# Patient Record
Sex: FEMALE | Race: ASIAN | Employment: UNEMPLOYED | URBAN - METROPOLITAN AREA
[De-identification: names, ages, dates, MRNs, and addresses within clinical notes are randomized per-mention and may not be internally consistent; named-entity substitution may affect disease eponyms.]

---

## 2021-03-19 ENCOUNTER — TELEPHONE (OUTPATIENT)
Dept: INTERNAL MEDICINE CLINIC | Age: 56
End: 2021-03-19

## 2021-03-19 ENCOUNTER — OFFICE VISIT (OUTPATIENT)
Dept: INTERNAL MEDICINE CLINIC | Age: 56
End: 2021-03-19
Payer: COMMERCIAL

## 2021-03-19 VITALS
TEMPERATURE: 97.3 F | BODY MASS INDEX: 25.58 KG/M2 | WEIGHT: 139 LBS | HEIGHT: 62 IN | DIASTOLIC BLOOD PRESSURE: 68 MMHG | SYSTOLIC BLOOD PRESSURE: 98 MMHG

## 2021-03-19 DIAGNOSIS — G47.00 INSOMNIA, UNSPECIFIED TYPE: ICD-10-CM

## 2021-03-19 DIAGNOSIS — Z12.31 ENCOUNTER FOR SCREENING MAMMOGRAM FOR BREAST CANCER: ICD-10-CM

## 2021-03-19 DIAGNOSIS — Z12.11 COLON CANCER SCREENING: Primary | ICD-10-CM

## 2021-03-19 DIAGNOSIS — Z13.220 SCREENING FOR HYPERLIPIDEMIA: ICD-10-CM

## 2021-03-19 DIAGNOSIS — F32.A DEPRESSION, UNSPECIFIED DEPRESSION TYPE: ICD-10-CM

## 2021-03-19 PROCEDURE — 1036F TOBACCO NON-USER: CPT | Performed by: INTERNAL MEDICINE

## 2021-03-19 PROCEDURE — 3017F COLORECTAL CA SCREEN DOC REV: CPT | Performed by: INTERNAL MEDICINE

## 2021-03-19 PROCEDURE — 99204 OFFICE O/P NEW MOD 45 MIN: CPT | Performed by: INTERNAL MEDICINE

## 2021-03-19 PROCEDURE — G8484 FLU IMMUNIZE NO ADMIN: HCPCS | Performed by: INTERNAL MEDICINE

## 2021-03-19 PROCEDURE — G8419 CALC BMI OUT NRM PARAM NOF/U: HCPCS | Performed by: INTERNAL MEDICINE

## 2021-03-19 PROCEDURE — G8427 DOCREV CUR MEDS BY ELIG CLIN: HCPCS | Performed by: INTERNAL MEDICINE

## 2021-03-19 RX ORDER — LANOLIN ALCOHOL/MO/W.PET/CERES
3 CREAM (GRAM) TOPICAL DAILY
Qty: 30 TABLET | Refills: 0 | Status: SHIPPED | OUTPATIENT
Start: 2021-03-19 | End: 2021-09-30

## 2021-03-19 RX ORDER — FLUOXETINE HYDROCHLORIDE 20 MG/1
20 CAPSULE ORAL DAILY
Qty: 30 CAPSULE | Refills: 3 | Status: SHIPPED | OUTPATIENT
Start: 2021-03-19 | End: 2022-09-13 | Stop reason: SDUPTHER

## 2021-03-19 ASSESSMENT — PATIENT HEALTH QUESTIONNAIRE - PHQ9
SUM OF ALL RESPONSES TO PHQ QUESTIONS 1-9: 2
SUM OF ALL RESPONSES TO PHQ9 QUESTIONS 1 & 2: 2
1. LITTLE INTEREST OR PLEASURE IN DOING THINGS: 1
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
## 2021-03-19 NOTE — TELEPHONE ENCOUNTER
At ck out pt's son, Kiet Koroma, on [facility], stated Dr. Maria C Lomeli mentioned pt should take otc medication but forgot what medication it was. Please advise.

## 2021-03-20 ASSESSMENT — ENCOUNTER SYMPTOMS
EYE DISCHARGE: 0
BLOOD IN STOOL: 0
ABDOMINAL PAIN: 0
CHEST TIGHTNESS: 0
BACK PAIN: 0
COLOR CHANGE: 0
APNEA: 0
SHORTNESS OF BREATH: 0
COUGH: 0
EYE REDNESS: 0
ABDOMINAL DISTENTION: 0
EYE PAIN: 0
EYE ITCHING: 0
CHOKING: 0
CONSTIPATION: 0
DIARRHEA: 0

## 2021-05-05 ENCOUNTER — HOSPITAL ENCOUNTER (OUTPATIENT)
Age: 56
Setting detail: SPECIMEN
Discharge: HOME | End: 2021-05-05
Payer: COMMERCIAL

## 2021-05-05 DIAGNOSIS — G47.00 INSOMNIA, UNSPECIFIED TYPE: ICD-10-CM

## 2021-05-05 DIAGNOSIS — Z13.220 SCREENING FOR HYPERLIPIDEMIA: ICD-10-CM

## 2021-05-05 DIAGNOSIS — F32.A DEPRESSION, UNSPECIFIED DEPRESSION TYPE: ICD-10-CM

## 2021-05-05 LAB
ALBUMIN SERPL-MCNC: 4.1 G/DL (ref 3.5–5.2)
ALBUMIN/GLOBULIN RATIO: 1.3 (ref 1–2.5)
ALP BLD-CCNC: 99 U/L (ref 35–104)
ALT SERPL-CCNC: 14 U/L (ref 5–33)
ANION GAP SERPL CALCULATED.3IONS-SCNC: 12 MMOL/L (ref 9–17)
AST SERPL-CCNC: 16 U/L
BILIRUB SERPL-MCNC: 0.99 MG/DL (ref 0.3–1.2)
BUN BLDV-MCNC: 14 MG/DL (ref 6–20)
BUN/CREAT BLD: ABNORMAL (ref 9–20)
CALCIUM SERPL-MCNC: 9.5 MG/DL (ref 8.6–10.4)
CHLORIDE BLD-SCNC: 105 MMOL/L (ref 98–107)
CHOLESTEROL, FASTING: 192 MG/DL
CHOLESTEROL/HDL RATIO: 3.4
CO2: 24 MMOL/L (ref 20–31)
CREAT SERPL-MCNC: 0.47 MG/DL (ref 0.5–0.9)
GFR AFRICAN AMERICAN: >60 ML/MIN
GFR NON-AFRICAN AMERICAN: >60 ML/MIN
GFR SERPL CREATININE-BSD FRML MDRD: ABNORMAL ML/MIN/{1.73_M2}
GFR SERPL CREATININE-BSD FRML MDRD: ABNORMAL ML/MIN/{1.73_M2}
GLUCOSE BLD-MCNC: 91 MG/DL (ref 70–99)
HCT VFR BLD CALC: 38.4 % (ref 36.3–47.1)
HDLC SERPL-MCNC: 56 MG/DL
HEMOGLOBIN: 12.5 G/DL (ref 11.9–15.1)
LDL CHOLESTEROL: 102 MG/DL (ref 0–130)
MCH RBC QN AUTO: 30.5 PG (ref 25.2–33.5)
MCHC RBC AUTO-ENTMCNC: 32.6 G/DL (ref 28.4–34.8)
MCV RBC AUTO: 93.7 FL (ref 82.6–102.9)
NRBC AUTOMATED: 0 PER 100 WBC
PDW BLD-RTO: 12.3 % (ref 11.8–14.4)
PLATELET # BLD: 277 K/UL (ref 138–453)
PMV BLD AUTO: 11.4 FL (ref 8.1–13.5)
POTASSIUM SERPL-SCNC: 5.2 MMOL/L (ref 3.7–5.3)
RBC # BLD: 4.1 M/UL (ref 3.95–5.11)
SODIUM BLD-SCNC: 141 MMOL/L (ref 135–144)
TOTAL PROTEIN: 7.3 G/DL (ref 6.4–8.3)
TRIGLYCERIDE, FASTING: 170 MG/DL
TSH SERPL DL<=0.05 MIU/L-ACNC: 1.62 MIU/L (ref 0.3–5)
VLDLC SERPL CALC-MCNC: ABNORMAL MG/DL (ref 1–30)
WBC # BLD: 7.3 K/UL (ref 3.5–11.3)

## 2021-05-18 DIAGNOSIS — Z12.11 COLON CANCER SCREENING: ICD-10-CM

## 2021-08-13 ENCOUNTER — TELEPHONE (OUTPATIENT)
Dept: FAMILY MEDICINE CLINIC | Age: 56
End: 2021-08-13

## 2021-08-13 NOTE — TELEPHONE ENCOUNTER
Paula Coates was contacted as part of mammography outreach. Unable to contact patient.      Beryl Cody

## 2021-09-30 ENCOUNTER — OFFICE VISIT (OUTPATIENT)
Dept: INTERNAL MEDICINE CLINIC | Age: 56
End: 2021-09-30
Payer: COMMERCIAL

## 2021-09-30 VITALS
SYSTOLIC BLOOD PRESSURE: 96 MMHG | WEIGHT: 137.6 LBS | BODY MASS INDEX: 25.32 KG/M2 | HEIGHT: 62 IN | DIASTOLIC BLOOD PRESSURE: 64 MMHG

## 2021-09-30 DIAGNOSIS — G47.00 INSOMNIA, UNSPECIFIED TYPE: ICD-10-CM

## 2021-09-30 DIAGNOSIS — F32.A DEPRESSION, UNSPECIFIED DEPRESSION TYPE: ICD-10-CM

## 2021-09-30 DIAGNOSIS — Z23 NEED FOR VACCINATION: ICD-10-CM

## 2021-09-30 DIAGNOSIS — M77.12 LATERAL EPICONDYLITIS OF LEFT ELBOW: Primary | ICD-10-CM

## 2021-09-30 PROCEDURE — G8419 CALC BMI OUT NRM PARAM NOF/U: HCPCS | Performed by: INTERNAL MEDICINE

## 2021-09-30 PROCEDURE — 90471 IMMUNIZATION ADMIN: CPT | Performed by: INTERNAL MEDICINE

## 2021-09-30 PROCEDURE — 1036F TOBACCO NON-USER: CPT | Performed by: INTERNAL MEDICINE

## 2021-09-30 PROCEDURE — 3017F COLORECTAL CA SCREEN DOC REV: CPT | Performed by: INTERNAL MEDICINE

## 2021-09-30 PROCEDURE — 99214 OFFICE O/P EST MOD 30 MIN: CPT | Performed by: INTERNAL MEDICINE

## 2021-09-30 PROCEDURE — G8427 DOCREV CUR MEDS BY ELIG CLIN: HCPCS | Performed by: INTERNAL MEDICINE

## 2021-09-30 PROCEDURE — 90674 CCIIV4 VAC NO PRSV 0.5 ML IM: CPT | Performed by: INTERNAL MEDICINE

## 2021-09-30 SDOH — ECONOMIC STABILITY: FOOD INSECURITY: WITHIN THE PAST 12 MONTHS, YOU WORRIED THAT YOUR FOOD WOULD RUN OUT BEFORE YOU GOT MONEY TO BUY MORE.: NEVER TRUE

## 2021-09-30 SDOH — ECONOMIC STABILITY: FOOD INSECURITY: WITHIN THE PAST 12 MONTHS, THE FOOD YOU BOUGHT JUST DIDN'T LAST AND YOU DIDN'T HAVE MONEY TO GET MORE.: NEVER TRUE

## 2021-09-30 ASSESSMENT — SOCIAL DETERMINANTS OF HEALTH (SDOH): HOW HARD IS IT FOR YOU TO PAY FOR THE VERY BASICS LIKE FOOD, HOUSING, MEDICAL CARE, AND HEATING?: NOT HARD AT ALL

## 2021-09-30 NOTE — PROGRESS NOTES
Subjective:      Patient ID: Jarred Martínez is a 55 y.o. female. HPI-patient is here for evaluation of multiple medical problems. She has depression, insomnia. She is on Prozac and melatonin. Her symptoms are very much controlled  She noticed pain in outer aspect of left elbow symptoms are going on for last few days. No injury, trauma, fall. Pain get worse with movement of her. No swelling noticed at the local site . She works at home, some time ends up lifting heavy stuff. No other complaints    Review of Systems   Constitutional: Negative for activity change, appetite change, chills, diaphoresis, fatigue and fever. HENT: Negative for congestion, dental problem, drooling and ear discharge. Eyes: Negative for pain, discharge, redness and itching. Respiratory: Negative for apnea, cough, choking, chest tightness and shortness of breath. Cardiovascular: Negative for chest pain and leg swelling. Gastrointestinal: Negative for abdominal distention, abdominal pain, blood in stool, constipation and diarrhea. Endocrine: Negative for cold intolerance and heat intolerance. Genitourinary: Negative for difficulty urinating, dysuria, enuresis, flank pain and frequency. Musculoskeletal: Positive for arthralgias (Outer aspect of left elbow. ). Negative for back pain, gait problem and joint swelling. Skin: Negative for color change, pallor and rash. Neurological: Negative for dizziness, facial asymmetry, light-headedness, numbness and headaches. Psychiatric/Behavioral: Negative for agitation, behavioral problems, confusion, decreased concentration and dysphoric mood. Objective:   Physical Exam  Constitutional:       Appearance: She is well-developed. She is not diaphoretic. HENT:      Head: Normocephalic and atraumatic. Mouth/Throat:      Pharynx: No oropharyngeal exudate. Eyes:      General: No scleral icterus. Right eye: No discharge. Left eye: No discharge. Conjunctiva/sclera: Conjunctivae normal.      Pupils: Pupils are equal, round, and reactive to light. Neck:      Thyroid: No thyromegaly. Vascular: No JVD. Trachea: No tracheal deviation. Cardiovascular:      Rate and Rhythm: Normal rate. Heart sounds: Normal heart sounds. No murmur heard. No gallop. Pulmonary:      Effort: Pulmonary effort is normal. No respiratory distress. Breath sounds: Normal breath sounds. No stridor. No wheezing or rales. Chest:      Chest wall: No tenderness. Abdominal:      General: Bowel sounds are normal. There is no distension. Palpations: Abdomen is soft. Tenderness: There is no abdominal tenderness. There is no guarding or rebound. Musculoskeletal:         General: Tenderness (Lateral epicondylitis of left elbow) present. Normal range of motion. Cervical back: Normal range of motion and neck supple. Neurological:      Mental Status: She is alert and oriented to person, place, and time. Assessment / Plan:   1. Lateral epicondylitis of left elbow  OTC TYlenol   - [employer] Arm[employer] Tennis Elbow strap REATIL$20    2. Need for vaccination  - INFLUENZA, MDCK QUADV, 2 YRS AND OLDER, IM, PF, PREFILL SYR OR SDV, 0.5ML (FLUCELVAX QUADV, PF)    3. Depression, unspecified depression type  Stable    4. Insomnia, unspecified type  Controlled on melatonin      · No follow-ups on file. · Reviewed prior labs and health maintenance. · Discussed use, benefit, and side effects of prescribed medications. Barriers to medication compliance addressed. All patient questions answered. Pt voiced understanding. Winnie Closs, MD [facility]  10/1/2021, 3:42 PM    Please note that this chart was generated using voice recognition Dragon dictation software. Although every effort was made to ensure the accuracy of this automated transcription, some errors in transcription may have occurred.         Visit Information    Have you changed or started any medications since your last visit including any over-the-counter medicines, vitamins, or herbal medicines? no   Are you having any side effects from any of your medications? -  no  Have you stopped taking any of your medications? Is so, why? -  no    Have you seen any other physician or provider since your last visit? No  Have you had any other diagnostic tests since your last visit? No  Have you been seen in the emergency room and/or had an admission to a hospital since we last saw you? No  Have you had your routine dental cleaning in the past 6 months? no    Have you activated your [employer] account? If not, what are your barriers?  Yes     Patient Care Team:  Lord Angela MD as PCP - General (Internal Medicine)  Lord Angela MD as PCP - [facility]    Medical History Review  Past Medical, Family, and Social History reviewed and does not contribute to the patient presenting condition    Health Maintenance   Topic Date Due    Hepatitis C screen  Never done    COVID-19 Vaccine (1) Never done    HIV screen  Never done    DTaP/Tdap/Td vaccine (1 - Tdap) Never done    Cervical cancer screen  Never done    Breast cancer screen  Never done    Shingles Vaccine (1 of 2) Never done    Flu vaccine (1) Never done    Colon cancer screen fecal DNA test (Cologuard)  05/10/2024    Lipid screen  05/05/2026    Hepatitis A vaccine  Aged Out    Hepatitis B vaccine  Aged Out    Hib vaccine  Aged Out    Meningococcal (ACWY) vaccine  Aged Out    Pneumococcal 0-64 years Vaccine  Aged Out

## 2021-10-01 ASSESSMENT — ENCOUNTER SYMPTOMS
BACK PAIN: 0
COLOR CHANGE: 0
EYE PAIN: 0
CONSTIPATION: 0
ABDOMINAL DISTENTION: 0
ABDOMINAL PAIN: 0
DIARRHEA: 0
CHEST TIGHTNESS: 0
EYE REDNESS: 0
COUGH: 0
SHORTNESS OF BREATH: 0
BLOOD IN STOOL: 0
CHOKING: 0
APNEA: 0
EYE DISCHARGE: 0
EYE ITCHING: 0

## 2022-09-13 ENCOUNTER — OFFICE VISIT (OUTPATIENT)
Dept: INTERNAL MEDICINE CLINIC | Age: 57
End: 2022-09-13
Payer: COMMERCIAL

## 2022-09-13 VITALS
HEIGHT: 62 IN | SYSTOLIC BLOOD PRESSURE: 102 MMHG | DIASTOLIC BLOOD PRESSURE: 64 MMHG | BODY MASS INDEX: 25.69 KG/M2 | OXYGEN SATURATION: 99 % | HEART RATE: 86 BPM | WEIGHT: 139.6 LBS

## 2022-09-13 DIAGNOSIS — F32.A DEPRESSION, UNSPECIFIED DEPRESSION TYPE: ICD-10-CM

## 2022-09-13 DIAGNOSIS — Z12.31 ENCOUNTER FOR SCREENING MAMMOGRAM FOR BREAST CANCER: Primary | ICD-10-CM

## 2022-09-13 DIAGNOSIS — E11.8 CONTROLLED TYPE 2 DIABETES MELLITUS WITH COMPLICATION, WITHOUT LONG-TERM CURRENT USE OF INSULIN (HCC): ICD-10-CM

## 2022-09-13 PROCEDURE — G8427 DOCREV CUR MEDS BY ELIG CLIN: HCPCS | Performed by: INTERNAL MEDICINE

## 2022-09-13 PROCEDURE — 3017F COLORECTAL CA SCREEN DOC REV: CPT | Performed by: INTERNAL MEDICINE

## 2022-09-13 PROCEDURE — 3046F HEMOGLOBIN A1C LEVEL >9.0%: CPT | Performed by: INTERNAL MEDICINE

## 2022-09-13 PROCEDURE — G8419 CALC BMI OUT NRM PARAM NOF/U: HCPCS | Performed by: INTERNAL MEDICINE

## 2022-09-13 PROCEDURE — 1036F TOBACCO NON-USER: CPT | Performed by: INTERNAL MEDICINE

## 2022-09-13 PROCEDURE — 99213 OFFICE O/P EST LOW 20 MIN: CPT | Performed by: INTERNAL MEDICINE

## 2022-09-13 PROCEDURE — 2022F DILAT RTA XM EVC RTNOPTHY: CPT | Performed by: INTERNAL MEDICINE

## 2022-09-13 RX ORDER — FLUOXETINE HYDROCHLORIDE 20 MG/1
20 CAPSULE ORAL DAILY
Qty: 90 CAPSULE | Refills: 3 | Status: SHIPPED | OUTPATIENT
Start: 2022-09-13

## 2022-09-13 ASSESSMENT — PATIENT HEALTH QUESTIONNAIRE - PHQ9
8. MOVING OR SPEAKING SO SLOWLY THAT OTHER PEOPLE COULD HAVE NOTICED. OR THE OPPOSITE, BEING SO FIGETY OR RESTLESS THAT YOU HAVE BEEN MOVING AROUND A LOT MORE THAN USUAL: 0
SUM OF ALL RESPONSES TO PHQ QUESTIONS 1-9: 0
6. FEELING BAD ABOUT YOURSELF - OR THAT YOU ARE A FAILURE OR HAVE LET YOURSELF OR YOUR FAMILY DOWN: 0
7. TROUBLE CONCENTRATING ON THINGS, SUCH AS READING THE NEWSPAPER OR WATCHING TELEVISION: 0
SUM OF ALL RESPONSES TO PHQ9 QUESTIONS 1 & 2: 0
2. FEELING DOWN, DEPRESSED OR HOPELESS: 0
1. LITTLE INTEREST OR PLEASURE IN DOING THINGS: 0
9. THOUGHTS THAT YOU WOULD BE BETTER OFF DEAD, OR OF HURTING YOURSELF: 0
SUM OF ALL RESPONSES TO PHQ QUESTIONS 1-9: 0
SUM OF ALL RESPONSES TO PHQ QUESTIONS 1-9: 0
5. POOR APPETITE OR OVEREATING: 0
10. IF YOU CHECKED OFF ANY PROBLEMS, HOW DIFFICULT HAVE THESE PROBLEMS MADE IT FOR YOU TO DO YOUR WORK, TAKE CARE OF THINGS AT HOME, OR GET ALONG WITH OTHER PEOPLE: 0
3. TROUBLE FALLING OR STAYING ASLEEP: 0
4. FEELING TIRED OR HAVING LITTLE ENERGY: 0
SUM OF ALL RESPONSES TO PHQ QUESTIONS 1-9: 0

## 2022-09-13 NOTE — PROGRESS NOTES
Visit Information    Have you changed or started any medications since your last visit including any over-the-counter medicines, vitamins, or herbal medicines? no   Are you having any side effects from any of your medications? -  no  Have you stopped taking any of your medications? Is so, why? -  no    Have you seen any other physician or provider since your last visit? No  Have you had any other diagnostic tests since your last visit? No  Have you been seen in the emergency room and/or had an admission to a hospital since we last saw you? No  Have you had your routine dental cleaning in the past 6 months? no    Have you activated your [employer] account? If not, what are your barriers? Yes     Patient Care Team:  Mechelle Wilson MD as PCP - General (Internal Medicine)  Mechelle Wilson MD as PCP - [facility]    Medical History Review  Past Medical, Family, and Social History reviewed and does contribute to the patient presenting condition    Health Maintenance   Topic Date Due    COVID-19 Vaccine (1) Never done    HIV screen  Never done    Hepatitis C screen  Never done    DTaP/Tdap/Td vaccine (1 - Tdap) Never done    Cervical cancer screen  Never done    Breast cancer screen  Never done    Shingles vaccine (1 of 2) Never done    Depression Monitoring  03/19/2022    Flu vaccine (1) 09/01/2022    Colorectal Cancer Screen  05/10/2024    Lipids  05/05/2026    Hepatitis A vaccine  Aged Out    Hepatitis B vaccine  Aged Out    Hib vaccine  Aged Out    Meningococcal (ACWY) vaccine  Aged Out    Pneumococcal 0-64 years Vaccine  Aged Out     SUBJECTIVE:  Scott Henry is a 65 y.o. female patient who  comes for complaints of   Chief Complaint   Patient presents with    Anxiety     Currently on prozac and states that it works well      Depression   Patient has been on prozac 20 mg daily for anxiety/depression for ~1yrs. Doing well on this. Reports poor compliance- missed several days.   Occ thoughts of self harm but would never act on it, advised re compliance with med. Denies any suicidal or homicidal ideation. Symptoms suboptimally controlled. Would like to continue on current dose of medication. Alcohol intake-no  Smoking-no        REVIEW OF SYSTEMS (except Subjective (HPI))  GENERAL: No fevers / chills  RESPIRATORY: Negative for cough, wheezing or shortness of breath  CARDIOVASCULAR: Negative for chest pain or palpitations. GI: no nausea, vomiting, or diarrhea  NEURO: No history of headaches    No past medical history on file. SOCIAL HISTORY:  Social History     Socioeconomic History    Marital status:      Spouse name: Not on file    Number of children: Not on file    Years of education: Not on file    Highest education level: Not on file   Occupational History    Not on file   Tobacco Use    Smoking status: Never    Smokeless tobacco: Never   Substance and Sexual Activity    Alcohol use: Not on file    Drug use: Not on file    Sexual activity: Not on file   Other Topics Concern    Not on file   Social History Narrative    Not on file     Social Determinants of Health     Financial Resource Strain: Low Risk     Difficulty of Paying Living Expenses: Not hard at all   Food Insecurity: No Food Insecurity    Worried About Running Out of Food in the Last Year: Never true    Ran Out of Food in the Last Year: Never true   Transportation Needs: Not on file   Physical Activity: Not on file   Stress: Not on file   Social Connections: Not on file   Intimate Partner Violence: Not on file   Housing Stability: Not on file           CURRENT MEDICATIONS:  Current Outpatient Medications   Medication Sig Dispense Refill    FLUoxetine (PROZAC) 20 MG capsule Take 1 capsule by mouth daily 30 capsule 3    melatonin (RA MELATONIN) 3 MG TABS tablet Take 1 tablet by mouth daily 30 tablet 0     No current facility-administered medications for this visit.          OBJECTIVE:  Vitals:    09/13/22 1331   BP: 102/64   Pulse: 86   SpO2: 99% Body mass index is 25.53 kg/m². General exam (except above):  General appearance - well appearing, alert, in no acute distress  Head - Atraumatic, normocephalic  Eyes - EOMI, no jaundice or pallor  Lungs - Lungs clear to auscultation. No wheezing, rhonchi, rales  Heart - RRR without murmur, gallop, or rubs. No ectopy  Abdomen - Abdomen soft, non-tender. Bowel sounds normal. No masses, organomegaly  Extremities -No significant edema, or skin discoloration. Good capillary refill. Neuro - Pt Alert, awake and oriented x 3. No gross focal neurological deficits    ASSESSMENT AND PLAN (MEDICAL DECISION MAKING):     Tammy was seen today for anxiety. Diagnoses and all orders for this visit:    Encounter for screening mammogram for breast cancer  -     [facility] Digital Screen Bilateral; Future    Controlled type 2 diabetes mellitus with complication, without long-term current use of insulin (HCC)    Depression, unspecified depression type  -     FLUoxetine (PROZAC) 20 MG capsule;  Take 1 capsule by mouth daily       Follow up in: Arline Saldana MD